# Patient Record
Sex: MALE
[De-identification: names, ages, dates, MRNs, and addresses within clinical notes are randomized per-mention and may not be internally consistent; named-entity substitution may affect disease eponyms.]

---
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
## 2023-06-12 ENCOUNTER — OFFICE VISIT (OUTPATIENT)
Dept: PRIMARY CARE | Facility: CLINIC | Age: 23
End: 2023-06-12
Payer: COMMERCIAL

## 2023-06-12 VITALS
BODY MASS INDEX: 24.78 KG/M2 | HEIGHT: 66 IN | SYSTOLIC BLOOD PRESSURE: 120 MMHG | WEIGHT: 154.2 LBS | OXYGEN SATURATION: 98 % | HEART RATE: 91 BPM | DIASTOLIC BLOOD PRESSURE: 84 MMHG

## 2023-06-12 DIAGNOSIS — Z13.220 SCREENING FOR HYPERLIPIDEMIA: ICD-10-CM

## 2023-06-12 DIAGNOSIS — Z00.00 ANNUAL PHYSICAL EXAM: Primary | ICD-10-CM

## 2023-06-12 DIAGNOSIS — Z13.1 SCREENING FOR DIABETES MELLITUS: ICD-10-CM

## 2023-06-12 DIAGNOSIS — Z13.29 SCREENING FOR THYROID DISORDER: ICD-10-CM

## 2023-06-12 DIAGNOSIS — F90.0 ATTENTION DEFICIT HYPERACTIVITY DISORDER (ADHD), PREDOMINANTLY INATTENTIVE TYPE: ICD-10-CM

## 2023-06-12 PROCEDURE — 99395 PREV VISIT EST AGE 18-39: CPT | Performed by: INTERNAL MEDICINE

## 2023-06-12 PROCEDURE — 1036F TOBACCO NON-USER: CPT | Performed by: INTERNAL MEDICINE

## 2023-06-12 ASSESSMENT — PATIENT HEALTH QUESTIONNAIRE - PHQ9
1. LITTLE INTEREST OR PLEASURE IN DOING THINGS: NOT AT ALL
2. FEELING DOWN, DEPRESSED OR HOPELESS: NOT AT ALL
SUM OF ALL RESPONSES TO PHQ9 QUESTIONS 1 AND 2: 0

## 2023-06-12 ASSESSMENT — ENCOUNTER SYMPTOMS: CONSTITUTIONAL NEGATIVE: 1

## 2023-06-12 NOTE — PROGRESS NOTES
"Patient ID: Christiano Almaraz is a 22 y.o. male who presents for Annual Exam (Has questions).    /84   Pulse 91   Ht 1.676 m (5' 6\")   Wt 69.9 kg (154 lb 3.2 oz)   SpO2 98%   BMI 24.89 kg/m²     HPI      Pt here for annual physical , new pt visit   PT CONCERNED IF HE HAS ADHD     Subjective     Review of Systems   Constitutional: Negative.    Psychiatric/Behavioral:          Pt has difficulty  decision making    All other systems reviewed and are negative.      Objective     Physical Exam  Vitals and nursing note reviewed.   Cardiovascular:      Rate and Rhythm: Normal rate and regular rhythm.      Pulses: Normal pulses.      Heart sounds: Normal heart sounds. No murmur heard.  Pulmonary:      Effort: Pulmonary effort is normal.      Breath sounds: Normal breath sounds.   Musculoskeletal:      Right lower leg: No edema.      Left lower leg: No edema.   Neurological:      General: No focal deficit present.      Mental Status: He is oriented to person, place, and time. Mental status is at baseline.   Psychiatric:         Mood and Affect: Mood normal.         Behavior: Behavior normal.         Thought Content: Thought content normal.         Judgment: Judgment normal.         No results found for: \"WBC\", \"HGB\", \"HCT\", \"MCV\", \"PLT\"        Problem List Items Addressed This Visit          Other    Annual physical exam - Primary    Relevant Orders    Comprehensive metabolic panel    Lipid panel     Other Visit Diagnoses       Screening for diabetes mellitus        Relevant Orders    Comprehensive metabolic panel    Screening for hyperlipidemia        Relevant Orders    Lipid panel    Attention deficit hyperactivity disorder (ADHD), predominantly inattentive type        Relevant Orders    Referral to Pediatric Psychiatry    Screening for thyroid disorder        Relevant Orders    Tsh With Reflex To Free T4 If Abnormal                 A/P         CMP, LIPID , TSH   [facility]   F/U  NEEDED   "

## 2023-06-14 LAB
ALANINE AMINOTRANSFERASE (SGPT) (U/L) IN SER/PLAS: 23 U/L (ref 10–52)
ALBUMIN (G/DL) IN SER/PLAS: 4.8 G/DL (ref 3.4–5)
ALKALINE PHOSPHATASE (U/L) IN SER/PLAS: 61 U/L (ref 33–120)
ANION GAP IN SER/PLAS: 11 MMOL/L (ref 10–20)
ASPARTATE AMINOTRANSFERASE (SGOT) (U/L) IN SER/PLAS: 20 U/L (ref 9–39)
BILIRUBIN TOTAL (MG/DL) IN SER/PLAS: 0.9 MG/DL (ref 0–1.2)
CALCIUM (MG/DL) IN SER/PLAS: 9.7 MG/DL (ref 8.6–10.6)
CARBON DIOXIDE, TOTAL (MMOL/L) IN SER/PLAS: 28 MMOL/L (ref 21–32)
CHLORIDE (MMOL/L) IN SER/PLAS: 105 MMOL/L (ref 98–107)
CHOLESTEROL (MG/DL) IN SER/PLAS: 115 MG/DL (ref 0–199)
CHOLESTEROL IN HDL (MG/DL) IN SER/PLAS: 50.6 MG/DL
CHOLESTEROL/HDL RATIO: 2.3
CREATININE (MG/DL) IN SER/PLAS: 0.88 MG/DL (ref 0.5–1.3)
GFR MALE: >90 ML/MIN/1.73M2
GLUCOSE (MG/DL) IN SER/PLAS: 86 MG/DL (ref 74–99)
LDL: 55 MG/DL (ref 0–109)
NON HDL CHOLESTEROL: 64 MG/DL (ref 0–119)
POTASSIUM (MMOL/L) IN SER/PLAS: 4.1 MMOL/L (ref 3.5–5.3)
PROTEIN TOTAL: 7.6 G/DL (ref 6.4–8.2)
SODIUM (MMOL/L) IN SER/PLAS: 140 MMOL/L (ref 136–145)
THYROTROPIN (MIU/L) IN SER/PLAS BY DETECTION LIMIT <= 0.05 MIU/L: 1.06 MIU/L (ref 0.44–3.98)
TRIGLYCERIDE (MG/DL) IN SER/PLAS: 49 MG/DL (ref 0–149)
UREA NITROGEN (MG/DL) IN SER/PLAS: 13 MG/DL (ref 6–23)
VLDL: 10 MG/DL (ref 0–40)

## 2023-06-14 PROCEDURE — 84443 ASSAY THYROID STIM HORMONE: CPT

## 2023-06-14 PROCEDURE — 80061 LIPID PANEL: CPT

## 2023-06-14 PROCEDURE — 80053 COMPREHEN METABOLIC PANEL: CPT

## 2024-11-20 ENCOUNTER — LAB (OUTPATIENT)
Dept: LAB | Facility: LAB | Age: 24
End: 2024-11-20
Payer: COMMERCIAL

## 2024-11-20 ENCOUNTER — APPOINTMENT (OUTPATIENT)
Dept: PRIMARY CARE | Facility: CLINIC | Age: 24
End: 2024-11-20
Payer: COMMERCIAL

## 2024-11-20 VITALS
BODY MASS INDEX: 25.04 KG/M2 | HEIGHT: 66 IN | DIASTOLIC BLOOD PRESSURE: 78 MMHG | WEIGHT: 155.8 LBS | HEART RATE: 98 BPM | SYSTOLIC BLOOD PRESSURE: 132 MMHG

## 2024-11-20 DIAGNOSIS — Z78.9 RECENT HISTORY OF FOREIGN TRAVEL: ICD-10-CM

## 2024-11-20 DIAGNOSIS — K21.9 GASTROESOPHAGEAL REFLUX DISEASE WITHOUT ESOPHAGITIS: ICD-10-CM

## 2024-11-20 DIAGNOSIS — Z00.00 ANNUAL PHYSICAL EXAM: Primary | ICD-10-CM

## 2024-11-20 DIAGNOSIS — Z00.00 ANNUAL PHYSICAL EXAM: ICD-10-CM

## 2024-11-20 PROCEDURE — 36415 COLL VENOUS BLD VENIPUNCTURE: CPT

## 2024-11-20 PROCEDURE — 86706 HEP B SURFACE ANTIBODY: CPT

## 2024-11-20 PROCEDURE — 80061 LIPID PANEL: CPT

## 2024-11-20 PROCEDURE — 99395 PREV VISIT EST AGE 18-39: CPT | Performed by: INTERNAL MEDICINE

## 2024-11-20 PROCEDURE — 83013 H PYLORI (C-13) BREATH: CPT

## 2024-11-20 PROCEDURE — 80053 COMPREHEN METABOLIC PANEL: CPT

## 2024-11-20 PROCEDURE — 86704 HEP B CORE ANTIBODY TOTAL: CPT

## 2024-11-20 PROCEDURE — 1036F TOBACCO NON-USER: CPT | Performed by: INTERNAL MEDICINE

## 2024-11-20 PROCEDURE — 3008F BODY MASS INDEX DOCD: CPT | Performed by: INTERNAL MEDICINE

## 2024-11-20 PROCEDURE — 86803 HEPATITIS C AB TEST: CPT

## 2024-11-20 PROCEDURE — 86708 HEPATITIS A ANTIBODY: CPT

## 2024-11-20 ASSESSMENT — PROMIS GLOBAL HEALTH SCALE
CARRYOUT_SOCIAL_ACTIVITIES: FAIR
RATE_SOCIAL_SATISFACTION: GOOD
CARRYOUT_PHYSICAL_ACTIVITIES: MOSTLY
RATE_MENTAL_HEALTH: GOOD
RATE_QUALITY_OF_LIFE: FAIR
RATE_PHYSICAL_HEALTH: FAIR
RATE_AVERAGE_FATIGUE: MILD
RATE_AVERAGE_PAIN: 0
RATE_GENERAL_HEALTH: FAIR
EMOTIONAL_PROBLEMS: SOMETIMES

## 2024-11-20 ASSESSMENT — PATIENT HEALTH QUESTIONNAIRE - PHQ9
2. FEELING DOWN, DEPRESSED OR HOPELESS: NOT AT ALL
1. LITTLE INTEREST OR PLEASURE IN DOING THINGS: NOT AT ALL
SUM OF ALL RESPONSES TO PHQ9 QUESTIONS 1 AND 2: 0

## 2024-11-20 ASSESSMENT — ENCOUNTER SYMPTOMS: CONSTITUTIONAL NEGATIVE: 1

## 2024-11-20 NOTE — PROGRESS NOTES
"Patient ID: Christiano Almaraz is a 24 y.o. male who presents for Annual Exam.    /78   Pulse 98   Ht 1.676 m (5' 6\")   Wt 70.7 kg (155 lb 12.8 oz)   BMI 25.15 kg/m²     HPI        PT HERE FOR CHECK UP   ANNUAL PHYSICAL     PT RECENTLY HAD FOREIGN TRAVEL ,  APPRXM 4-5 MONTHS BEFORE   HE DOES NOT HAVE  DIAARHEA   EXCEPT HE GETS GERD SYMPTOMS   HE EATS SPICEY FOOD   HIS FATHER BEEN EXPOSED TO HEP A   INFECTION WITH THE SAME TRAVEL     OTHERWISE HE IS HEALTHY         Subjective     Review of Systems   Constitutional: Negative.    All other systems reviewed and are negative.      Objective     Physical Exam  Vitals reviewed.   Constitutional:       Appearance: Normal appearance.   Neck:      Vascular: No carotid bruit.   Cardiovascular:      Rate and Rhythm: Normal rate and regular rhythm.      Pulses: Normal pulses.      Heart sounds: Normal heart sounds. No murmur heard.  Pulmonary:      Effort: Pulmonary effort is normal.      Breath sounds: Normal breath sounds.   Abdominal:      Palpations: There is no mass.      Hernia: No hernia is present.   Musculoskeletal:      Right lower leg: No edema.      Left lower leg: No edema.   Skin:     Capillary Refill: Capillary refill takes more than 3 seconds.   Neurological:      General: No focal deficit present.      Mental Status: He is oriented to person, place, and time. Mental status is at baseline.   Psychiatric:         Mood and Affect: Mood normal.         Behavior: Behavior normal.         Thought Content: Thought content normal.         Judgment: Judgment normal.         No results found for: \"WBC\", \"HGB\", \"HCT\", \"MCV\", \"PLT\"        Problem List Items Addressed This Visit       Annual physical exam - Primary    Relevant Orders    Comprehensive metabolic panel    Lipid panel    Hepatitis B core antibody, total    Hepatitis C antibody    Hepatitis B surface antibody     Other Visit Diagnoses       Gastroesophageal reflux disease without esophagitis        Relevant " Orders    H. Pylori Breath Test    Recent history of foreign travel        Relevant Orders    Hepatitis A antibodies, total              A/P       HEP B CORE   HEP C ANTIBODY   HEP B SURFACE AB   HEP A TOTAL ANTIBODY   HELICOBACTER PYLORI BREATH TEST   CMP, LIPID   WILL LET YOU KNOW ABOUT THE TEST RESULT   FOLLOW UP IF NEEDED

## 2024-11-21 DIAGNOSIS — R73.9 HYPERGLYCEMIA: Primary | ICD-10-CM

## 2024-11-21 LAB
ALBUMIN SERPL BCP-MCNC: 4.9 G/DL (ref 3.4–5)
ALP SERPL-CCNC: 53 U/L (ref 33–120)
ALT SERPL W P-5'-P-CCNC: 25 U/L (ref 10–52)
ANION GAP SERPL CALC-SCNC: 14 MMOL/L (ref 10–20)
AST SERPL W P-5'-P-CCNC: 16 U/L (ref 9–39)
BILIRUB SERPL-MCNC: 0.7 MG/DL (ref 0–1.2)
BUN SERPL-MCNC: 16 MG/DL (ref 6–23)
CALCIUM SERPL-MCNC: 9.3 MG/DL (ref 8.6–10.6)
CHLORIDE SERPL-SCNC: 104 MMOL/L (ref 98–107)
CHOLEST SERPL-MCNC: 126 MG/DL (ref 0–199)
CHOLESTEROL/HDL RATIO: 2.4
CO2 SERPL-SCNC: 25 MMOL/L (ref 21–32)
CREAT SERPL-MCNC: 0.92 MG/DL (ref 0.5–1.3)
EGFRCR SERPLBLD CKD-EPI 2021: >90 ML/MIN/1.73M*2
GLUCOSE SERPL-MCNC: 123 MG/DL (ref 74–99)
HAV AB SER QL IA: NONREACTIVE
HBV CORE AB SER QL: NONREACTIVE
HBV SURFACE AB SER-ACNC: <3.1 MIU/ML
HCV AB SER QL: NONREACTIVE
HDLC SERPL-MCNC: 52.2 MG/DL
LDLC SERPL CALC-MCNC: 61 MG/DL
NON HDL CHOLESTEROL: 74 MG/DL (ref 0–149)
POTASSIUM SERPL-SCNC: 3.8 MMOL/L (ref 3.5–5.3)
PROT SERPL-MCNC: 7.2 G/DL (ref 6.4–8.2)
SODIUM SERPL-SCNC: 139 MMOL/L (ref 136–145)
TRIGL SERPL-MCNC: 62 MG/DL (ref 0–114)
UREA BREATH TEST QL: NEGATIVE
VLDL: 12 MG/DL (ref 0–40)

## 2025-08-27 ENCOUNTER — TELEPHONE (OUTPATIENT)
Dept: PRIMARY CARE | Facility: CLINIC | Age: 25
End: 2025-08-27
Payer: COMMERCIAL

## 2025-08-27 ENCOUNTER — OFFICE VISIT (OUTPATIENT)
Dept: URGENT CARE | Age: 25
End: 2025-08-27

## 2025-08-27 DIAGNOSIS — Z11.1 ENCOUNTER FOR TB TINE TEST: ICD-10-CM

## 2025-08-27 PROCEDURE — 86580 TB INTRADERMAL TEST: CPT | Performed by: FAMILY MEDICINE

## 2025-08-27 PROCEDURE — 99499 UNLISTED E&M SERVICE: CPT | Performed by: FAMILY MEDICINE

## 2025-08-30 ENCOUNTER — OFFICE VISIT (OUTPATIENT)
Dept: URGENT CARE | Age: 25
End: 2025-08-30
Payer: COMMERCIAL

## 2025-08-30 VITALS — TEMPERATURE: 98.2 F

## 2025-08-30 DIAGNOSIS — Z11.1 SCREENING FOR TUBERCULOSIS: Primary | ICD-10-CM

## 2025-08-30 LAB
INDURATION: 0 MM
TB SKIN TEST: NEGATIVE